# Patient Record
Sex: MALE | Race: WHITE | NOT HISPANIC OR LATINO | Employment: STUDENT | URBAN - METROPOLITAN AREA
[De-identification: names, ages, dates, MRNs, and addresses within clinical notes are randomized per-mention and may not be internally consistent; named-entity substitution may affect disease eponyms.]

---

## 2017-08-11 ENCOUNTER — OFFICE VISIT (OUTPATIENT)
Dept: PEDIATRICS | Facility: CLINIC | Age: 22
End: 2017-08-11
Payer: COMMERCIAL

## 2017-08-11 VITALS
HEIGHT: 73 IN | BODY MASS INDEX: 30.38 KG/M2 | SYSTOLIC BLOOD PRESSURE: 124 MMHG | TEMPERATURE: 99 F | WEIGHT: 229.25 LBS | OXYGEN SATURATION: 97 % | HEART RATE: 87 BPM | DIASTOLIC BLOOD PRESSURE: 58 MMHG

## 2017-08-11 DIAGNOSIS — R05.9 COUGH: ICD-10-CM

## 2017-08-11 DIAGNOSIS — J32.9 RHINOSINUSITIS: Primary | ICD-10-CM

## 2017-08-11 PROCEDURE — 99213 OFFICE O/P EST LOW 20 MIN: CPT | Mod: S$GLB,,, | Performed by: PEDIATRICS

## 2017-08-11 PROCEDURE — 3008F BODY MASS INDEX DOCD: CPT | Mod: S$GLB,,, | Performed by: PEDIATRICS

## 2017-08-11 RX ORDER — AZITHROMYCIN 500 MG/1
500 TABLET, FILM COATED ORAL DAILY
Qty: 7 TABLET | Refills: 0 | Status: SHIPPED | OUTPATIENT
Start: 2017-08-11 | End: 2017-08-18

## 2017-08-11 RX ORDER — PREDNISONE 20 MG/1
60 TABLET ORAL DAILY
Qty: 15 TABLET | Refills: 0 | Status: SHIPPED | OUTPATIENT
Start: 2017-08-11 | End: 2017-08-16

## 2017-08-11 NOTE — PATIENT INSTRUCTIONS
Understanding Sinus Problems    You dont often think about your sinuses until theres a problem. One day you realize you cant smell dinner cooking. Or you find you often have headaches or problems breathing through your nose.  Symptoms of sinus problems  Sinus problems can cause uncomfortable symptoms. Your nose may run constantly. You might have trouble sleeping at night. You may even lose your sense of smell. Other symptoms can include:  · Nasal congestion  · Fullness in ears  · Green, yellow, or bloody drainage from the nose  · Trouble tasting food  · Frequent headaches  · Facial pain  · Cough  When sinuses are blocked  If something blocks the passages in the nose or sinuses, mucus cant drain. Mucus-filled sinuses often become infected.  · Colds cause the lining of the nose and sinuses to swell and make extra mucus. A buildup of mucus can lead to a more serious infection.  · Allergies irritate turbinates and other tissues. This causes swelling, which can cause a blockage. Over time, this irritation can also lead to sacs of swollen tissue (polyps).  · Polyps may form in both the sinuses and nose. Polyps can grow large enough to clog nasal passages and block drainage.  · A crooked (deviated) septum may block nasal passages. This is often the result of an injury.  Date Last Reviewed: 11/1/2016  © 5959-4308 [employer]. [address] [place] [address], [place] [address]. All rights reserved. This information is not intended as a substitute for professional medical care. Always follow your healthcare professional's instructions.        Walking Pneumonia (Mycoplasma Pneumonia)  What is walking pneumonia?    Pneumonia is an infection of one or both of the lungs. It's usually caused by a virus, or bacteria. Walking pneumonia is caused by a specific bacteria. Pneumonia can be very serious, especially in infants, young children, and older adults. And also in those with other long-term health problems or weak  immune systems. In otherwise healthy adults, pneumonia can be mild. Mycoplasma pneumonia is a mild form and is often called walking pneumonia.  What are the symptoms of walking pneumonia?  The most common symptom is a dry, hacking cough. You may also feel tired.  Other symptoms may include:  · Fever  · Headaches  · Chills  · Sweating  · Chest pain  · Ear pain  · Sore throat  How is walking pneumonia diagnosed?  Your health care provider will ask about your medical history and current symptoms. He or she will also examine you. You may also have diagnostic tests including:  · Imaging. A chest X-ray or CT scan of your chest may be done.  · Lab tests. Blood tests and sputum cultures may be done.  How is walking pneumonia treated?  Since it's caused by bacteria, antibiotic medication is usually prescribed. However, it may also go away without any treatment.  Your health care provider may also recommend medications, either prescription or over-the-counter, or other treatment to relieve symptoms. He or she may recommend:  · Acetaminophen or ibuprofen to lower your fever and lessen headache or other pain  · Cough medication to loosen mucus or reduce coughing  · Bedrest or reduced activity  · Increased fluids to loosen mucus and replace lost fluids from sweating  Make sure you check with your health care provider or pharmacist before taking any over-the-counter medications.  What are the complications of walking pneumonia?  Although walking pneumonia is usually mild, and it often goes away without treatment, complications can occur. They include:  · Severe pneumonia  · Serious infections in other parts of the body  · Anemia or a low red blood cell count  · Kidney problems  · Skin conditions  What can I do to prevent walking pneumonia?  To prevent others from getting walking pneumonia:  · Try to stay away from other people if you are coughing a lot.  · Cover your mouth when you cough. It's easy to pass along this infection to  other people through coughing, and contact with surfaces that you cough near.  · Wipe off surfaces, including phones, remote controls, and door knobs with antibacterial or disinfectant products.  · Tell people to wash their hands if they touch things you have coughed near.  · Make sure to wash your hands often. Wash them before handling food or objects that others may touch. Use a separate towel or paper towels for drying.  Date Last Reviewed: 8/5/2014  © 9895-3123 [employer]. [address] [place] [address], [place] [address]. All rights reserved. This information is not intended as a substitute for professional medical care. Always follow your healthcare professional's instructions.

## 2017-08-11 NOTE — PROGRESS NOTES
Subjective:       History provided by patient and patient was brought in for Cough (x 1 day, by self); Nasal Congestion; and Fever    .    History of Present Illness:  HPI Comments: This is a patient well known to my practice who  has no past medical history on file. . The patient presents with cough with chills and a fever.         Review of Systems   Constitutional: Positive for fever.   HENT: Positive for congestion.    Eyes: Negative.    Respiratory: Positive for cough.    Cardiovascular: Negative.    Gastrointestinal: Negative.    Genitourinary: Negative.    Musculoskeletal: Negative.    Neurological: Negative.    Psychiatric/Behavioral: Negative.        Objective:     Physical Exam   HENT:   Right Ear: Hearing normal. Tympanic membrane is erythematous. A middle ear effusion is present.   Left Ear: Hearing normal. Tympanic membrane is erythematous. A middle ear effusion is present.   Nose: Rhinorrhea present. No mucosal edema.   Mouth/Throat: Oropharynx is clear and moist and mucous membranes are normal. No oral lesions.   Cardiovascular: Normal heart sounds.    No murmur heard.  Pulmonary/Chest: Effort normal and breath sounds normal.   Skin: Skin is warm. No rash noted.   Psychiatric: Mood and affect normal.         Assessment:     1. Rhinosinusitis    2. Cough        Plan:     Rhinosinusitis  -     azithromycin (ZITHROMAX) 500 MG tablet; Take 1 tablet (500 mg total) by mouth once daily.  Dispense: 7 tablet; Refill: 0  -     predniSONE (DELTASONE) 20 MG tablet; Take 3 tablets (60 mg total) by mouth once daily.  Dispense: 15 tablet; Refill: 0    Cough  -     azithromycin (ZITHROMAX) 500 MG tablet; Take 1 tablet (500 mg total) by mouth once daily.  Dispense: 7 tablet; Refill: 0  -     predniSONE (DELTASONE) 20 MG tablet; Take 3 tablets (60 mg total) by mouth once daily.  Dispense: 15 tablet; Refill: 0

## 2018-10-15 ENCOUNTER — OFFICE VISIT (OUTPATIENT)
Dept: FAMILY MEDICINE | Facility: CLINIC | Age: 23
End: 2018-10-15
Payer: COMMERCIAL

## 2018-10-15 VITALS
HEART RATE: 68 BPM | BODY MASS INDEX: 24.08 KG/M2 | TEMPERATURE: 98 F | WEIGHT: 181.69 LBS | OXYGEN SATURATION: 98 % | SYSTOLIC BLOOD PRESSURE: 114 MMHG | DIASTOLIC BLOOD PRESSURE: 70 MMHG | HEIGHT: 73 IN

## 2018-10-15 DIAGNOSIS — R30.0 DYSURIA: ICD-10-CM

## 2018-10-15 DIAGNOSIS — Z23 NEED FOR DIPHTHERIA, TETANUS, PERTUSSIS, AND HIB VACCINATION: ICD-10-CM

## 2018-10-15 DIAGNOSIS — R41.840 POOR CONCENTRATION: Primary | ICD-10-CM

## 2018-10-15 PROCEDURE — 99203 OFFICE O/P NEW LOW 30 MIN: CPT | Mod: 25,S$GLB,, | Performed by: FAMILY MEDICINE

## 2018-10-15 PROCEDURE — 99999 PR PBB SHADOW E&M-EST. PATIENT-LVL IV: CPT | Mod: PBBFAC,,, | Performed by: FAMILY MEDICINE

## 2018-10-15 PROCEDURE — 87086 URINE CULTURE/COLONY COUNT: CPT

## 2018-10-15 PROCEDURE — 90471 IMMUNIZATION ADMIN: CPT | Mod: S$GLB,,, | Performed by: FAMILY MEDICINE

## 2018-10-15 PROCEDURE — 3008F BODY MASS INDEX DOCD: CPT | Mod: CPTII,S$GLB,, | Performed by: FAMILY MEDICINE

## 2018-10-15 PROCEDURE — 90715 TDAP VACCINE 7 YRS/> IM: CPT | Mod: S$GLB,,, | Performed by: FAMILY MEDICINE

## 2018-10-15 NOTE — PROGRESS NOTES
Chief Complaint   Patient presents with    ADHD    referral    Urinary Tract Infection       HPI  William Herrera is a 23 y.o. male with multiple medical diagnoses as listed in the medical history and problem list that presents for     Poor concentration- he would like a referral to psych for a formal evaluation as he has had recent difficulties with his college classes and has not been able to stay focused during lectures. He reports having trouble with this during school in his childhood and being reported for daydreaming but being able to manage with his grades. He is studying hospitality    UTI- has been having urinary frequency and some discomfort, treated with azo tablets, has had symptoms resolve but would like to make sure it has gone    PAST MEDICAL HISTORY:  History reviewed. No pertinent past medical history.    PAST SURGICAL HISTORY:  History reviewed. No pertinent surgical history.    SOCIAL HISTORY:  Social History     Socioeconomic History    Marital status: Single     Spouse name: Not on file    Number of children: Not on file    Years of education: Not on file    Highest education level: Not on file   Social Needs    Financial resource strain: Not on file    Food insecurity - worry: Not on file    Food insecurity - inability: Not on file    Transportation needs - medical: Not on file    Transportation needs - non-medical: Not on file   Occupational History     Comment: hotel    Tobacco Use    Smoking status: Light Tobacco Smoker    Smokeless tobacco: Never Used   Substance and Sexual Activity    Alcohol use: Yes     Drinks per session: 1 or 2     Binge frequency: Weekly    Drug use: No    Sexual activity: No   Other Topics Concern    Not on file   Social History Narrative    Not on file       FAMILY HISTORY:  Family History   Problem Relation Age of Onset    Thyroid disease Sister     Diabetes Maternal Aunt     Diabetes Maternal Grandmother     Heart disease Maternal  "Grandmother     Heart disease Maternal Grandfather        ALLERGIES AND MEDICATIONS: updated and reviewed.  Review of patient's allergies indicates:  No Known Allergies  No current outpatient medications on file.     No current facility-administered medications for this visit.        ROS  Review of Systems   Constitutional: Negative for chills, fatigue, fever and unexpected weight change.   HENT: Negative for ear pain, postnasal drip, rhinorrhea, sinus pressure and sore throat.    Eyes: Negative for photophobia and visual disturbance.   Respiratory: Negative for apnea, cough, chest tightness, shortness of breath and wheezing.    Cardiovascular: Negative for chest pain and palpitations.   Gastrointestinal: Negative for abdominal pain, blood in stool, constipation, diarrhea, nausea and vomiting.   Genitourinary: Positive for difficulty urinating and dysuria.   Musculoskeletal: Negative for arthralgias and joint swelling.   Skin: Negative for rash.   Neurological: Negative for facial asymmetry, speech difficulty, weakness, numbness and headaches.   Psychiatric/Behavioral: Negative for dysphoric mood.       Physical Exam  Vitals:    10/15/18 0833   BP: 114/70   BP Location: Left arm   Patient Position: Sitting   BP Method: Small (Manual)   Pulse: 68   Temp: 98.2 °F (36.8 °C)   TempSrc: Oral   SpO2: 98%   Weight: 82.4 kg (181 lb 11.2 oz)   Height: 6' 0.6" (1.844 m)    Body mass index is 24.24 kg/m².  Weight: 82.4 kg (181 lb 11.2 oz)   Height: 6' 0.6" (184.4 cm)     Physical Exam   Constitutional: He is oriented to person, place, and time. He appears well-developed and well-nourished.   HENT:   Head: Normocephalic and atraumatic.   Mouth/Throat: No oropharyngeal exudate.   Eyes: EOM are normal.   Cardiovascular: Normal rate, regular rhythm and normal heart sounds. Exam reveals no gallop and no friction rub.   No murmur heard.  Pulmonary/Chest: Effort normal and breath sounds normal. No respiratory distress. He has no " wheezes. He has no rales. He exhibits no tenderness.   Abdominal: Soft. Bowel sounds are normal. He exhibits no distension and no mass. There is no tenderness. There is no rebound and no guarding. No hernia.   Lymphadenopathy:     He has no cervical adenopathy.   Neurological: He is alert and oriented to person, place, and time.   Skin: Skin is warm and dry.   Psychiatric: He has a normal mood and affect. His behavior is normal.   Nursing note and vitals reviewed.      Health Maintenance       Date Due Completion Date    TETANUS VACCINE 08/01/2018 8/1/2008    Influenza Vaccine 08/01/2018 9/20/2011            ASSESSMENT     1. Poor concentration    2. Dysuria    3. Need for diphtheria, tetanus, pertussis, and Hib vaccination        PLAN:     Problem List Items Addressed This Visit     None      Visit Diagnoses     Poor concentration    -  Primary  -referral given for psych eval for ADD    Relevant Orders    Ambulatory consult to Psychiatry    Dysuria      -sounds like this is resolving, but will culture    Relevant Orders    Urine culture    Need for diphtheria, tetanus, pertussis, and Hib vaccination      -as ordered       Relevant Orders    Tdap Vaccine Greater than or Eqaul to 7 y.o.            Ivet Wyatt MD  10/15/2018 8:47 AM        Follow-up if symptoms worsen or fail to improve.

## 2018-10-15 NOTE — PROGRESS NOTES
Patient tolerated Tdap with no complication. Patient received VIS information. Advise 15 min for any possible reactions.

## 2018-10-16 ENCOUNTER — TELEPHONE (OUTPATIENT)
Dept: FAMILY MEDICINE | Facility: CLINIC | Age: 23
End: 2018-10-16

## 2018-10-16 LAB — BACTERIA UR CULT: NO GROWTH

## 2019-01-03 ENCOUNTER — OFFICE VISIT (OUTPATIENT)
Dept: PSYCHIATRY | Facility: CLINIC | Age: 24
End: 2019-01-03
Payer: COMMERCIAL

## 2019-01-03 VITALS
BODY MASS INDEX: 23.6 KG/M2 | SYSTOLIC BLOOD PRESSURE: 116 MMHG | HEART RATE: 59 BPM | DIASTOLIC BLOOD PRESSURE: 62 MMHG | WEIGHT: 174.25 LBS | HEIGHT: 72 IN

## 2019-01-03 DIAGNOSIS — R41.840 DIFFICULTY CONCENTRATING: Primary | ICD-10-CM

## 2019-01-03 DIAGNOSIS — F32.0 CURRENT MILD EPISODE OF MAJOR DEPRESSIVE DISORDER, UNSPECIFIED WHETHER RECURRENT: ICD-10-CM

## 2019-01-03 DIAGNOSIS — F12.10 CANNABIS ABUSE: ICD-10-CM

## 2019-01-03 LAB
AMP D-AMPHETAMINE 1000 NG/ML: NEGATIVE
BAR SECOBARBITAL 300 NG/ML: NEGATIVE
BUP BUPRENORPHINE 10 NG/ML: NEGATIVE
BZO OXAZEPAM 300 NG/ML: NEGATIVE
COC BENZOYLECGONINE 300 NG/ML: NEGATIVE
MAMP D-METHAMPHETAMINE 1000 NG/ML: NEGATIVE
MOP MORPHINE 300 NG/ML: NEGATIVE
MTD METHADONE 300 NG/ML: NEGATIVE
QXY OXYCODONE 100 NG/ML: NEGATIVE
THC 11-NOR-9-TETRAHYDROCANNABINOL-9-CARBOXYLIC ACID: POSITIVE

## 2019-01-03 PROCEDURE — 3008F PR BODY MASS INDEX (BMI) DOCUMENTED: ICD-10-PCS | Mod: CPTII,S$GLB,, | Performed by: PSYCHIATRY & NEUROLOGY

## 2019-01-03 PROCEDURE — 3008F BODY MASS INDEX DOCD: CPT | Mod: CPTII,S$GLB,, | Performed by: PSYCHIATRY & NEUROLOGY

## 2019-01-03 PROCEDURE — 80305 DRUG TEST PRSMV DIR OPT OBS: CPT | Mod: QW,S$GLB,, | Performed by: PSYCHIATRY & NEUROLOGY

## 2019-01-03 PROCEDURE — 80305 POCT URINE DRUG SCREEN PAIN MANAGEMENT: ICD-10-PCS | Mod: QW,S$GLB,, | Performed by: PSYCHIATRY & NEUROLOGY

## 2019-01-03 PROCEDURE — 99204 PR OFFICE/OUTPT VISIT, NEW, LEVL IV, 45-59 MIN: ICD-10-PCS | Mod: S$GLB,,, | Performed by: PSYCHIATRY & NEUROLOGY

## 2019-01-03 PROCEDURE — 99204 OFFICE O/P NEW MOD 45 MIN: CPT | Mod: S$GLB,,, | Performed by: PSYCHIATRY & NEUROLOGY

## 2019-01-03 RX ORDER — BUPROPION HYDROCHLORIDE 150 MG/1
150 TABLET ORAL DAILY
Qty: 30 TABLET | Refills: 1 | Status: SHIPPED | OUTPATIENT
Start: 2019-01-03 | End: 2019-01-31

## 2019-01-03 NOTE — PROGRESS NOTES
"Outpatient Psychiatry Initial Visit (MD/NP)    1/3/2019    William Herrera, a 23 y.o. male, presenting for initial evaluation visit. Met with patient.    Reason for Encounter: Referral from Dr. Wyatt. Patient complains of Inattentiveness.    History of Present Illness:   Patient reports that he has no previous mental health history. He reports that he presents for an ADHD evaluation. He reports that he read an article online about someone's experience with ADHD inattentive type. He felt like a lot of the experiences that this person had applied to him. He reports that as a child he was day dreamer and he would never be completely focus on the material. He reports that this continued into high school and college. He felt like he always loses track of his sentences and loses his train of thought very easily. He did well in high school but reports that he took easier classes because he wanted to avoid classes that would require more focus and time. He reports that he would often times not want to do the things that his Mother asked him to do.    He has noticed that his symptoms have started to affect him in school and he is currently on academic probation at [employer] for having a GPA below 2.0. He recently took the fall semester off to reevaluate what he wants to do. During his time off he has looked at what life would be like if he were to not complete school. He feels like the job field is not going to be very fruitful if he does not have a bachelors degree. He feels like he has invested a lot of time in this and he wants to complete it.      He has also had problems with remembering things that happen at work and he states that he has to take multiple notes while he is at work to add them to a report at the end of the day. He states that when he was younger his memory was better. At one point he states, "I thought I had dementia."     He reports that in the past month he has had a slightly depressed mood. He has not had any " issues with eating and sleeping. His energy level has been fine. He endorses some guilt about leaving his mother home alone but denies any issues of hopelessness or worthlessness.    He denies any issues with constant worry or panic attacks. He denies any episodes of neelam or psychosis.    At this time he is looking for help with staying focused with school and work. He denies any SI/HI/AVH.     Collateral: Lisbeth Herrera Mother [phone]  She reports that her son had some issues with being passive and being bullied as a child however he did not have any serious behavior issues. She reports that he tried multiple different activities however he could not stay on track with anything. She states that he did not do well in school due to not putting effort into things. He is very intelligent but he does not put the time in. He had chores in the house such as taking out the trash and keeping his room clean however he would not do these tasks without being threatened to be punished.    She also mentions that the patient has a strained relationship with his father due to his sexuality and his father not approving his sexuality.    Review Of Systems:     Pertinent items are noted in HPI.    Current Evaluation:     Nutritional Screening: Considering the patient's height and weight, medications, medical history and preferences, should a referral be made to the dietitian? no    Constitutional  Vitals:  Most recent vital signs, dated less than 90 days prior to this appointment, were reviewed.    There were no vitals filed for this visit.     General:  unremarkable, age appropriate     Musculoskeletal  Muscle Strength/Tone:  no spasicity, no rigidity   Gait & Station:  non-ataxic     Psychiatric  Speech:  no latency; no press   Mood & Affect:  euthymic  congruent and appropriate   Thought Process:  normal and logical   Associations:  intact   Thought Content:  normal, no suicidality, no homicidality, delusions, or paranoia    Insight:  intact   Judgement: behavior is adequate to circumstances   Orientation:  grossly intact   Memory: intact for content of interview   Language: grossly intact   Attention Span & Concentration:  able to focus   Fund of Knowledge:  intact and appropriate to age and level of education       Relevant Elements of Neurological Exam: normal gait    Functioning in Relationships:  Spouse/partner: Patient is not in a relationship  Peers: Limited social network  Employers: Good relationship    Laboratory Data  Appointment on 01/03/2019   Component Date Value Ref Range Status    MAMP d-Methamphetamine 1000 ng/mL * 01/03/2019 Negative   Final    THC 95-byf-7-Tetrahydrocannabinol-* 01/03/2019 Positive   Final    LISSETH Benzoylecgonine 300 ng/mL POC 01/03/2019 Negative   Final    BZO Oxazepam 300 ng/mL POC 01/03/2019 Negative   Final    BUP Buprenorphine 10 ng/mL POC 01/03/2019 Negative   Final    BAR Secobarbital 300 ng/mL POC 01/03/2019 Negative   Final    AMP d-Amphetamine 1000 ng/mL POC 01/03/2019 Negative   Final    OXY Oxycodone 100 ng/mL POC 01/03/2019 Negative   Final    MOP Morphine 300 ng/mL POC 01/03/2019 Negative   Final    MTD Methadone 300 ng/mL POC 01/03/2019 Negative   Final         Medications  No outpatient encounter medications on file as of 1/3/2019.     No facility-administered encounter medications on file as of 1/3/2019.            Assessment - Diagnosis - Goals:     Impression:       ICD-10-CM ICD-9-CM   1. Difficulty concentrating R41.840 799.51   2. Current mild episode of major depressive disorder, unspecified whether recurrent F32.0 296.21   3. Cannabis abuse F12.10 305.20       Strengths and Liabilities: Strength: Patient accepts guidance/feedback, Strength: Patient is expressive/articulate., Strength: Patient is intelligent., Strength: Patient is motivated for change., Strength: Patient is physically healthy., Strength: Patient has positive support network.    Treatment Goals:   Specify outcomes written in observable, behavioral terms:   Depression: increasing energy, increasing motivation and reducing fatigue    Treatment Plan/Recommendations:   · Medication Management: The risks and benefits of medication were discussed with the patient.   · Recommended discontinuation of recreational cannabis use  · Initiate Wellbutrin  mg PO daily  · Will consider stimulant in the future if symptoms persist with a negative Urine Drug Screen      Return to Clinic: 4 weeks    Counseling time: 75 minutes  Total time: 85 minutes  Consulting clinician was informed of the encounter and consult note.

## 2019-01-10 NOTE — PROGRESS NOTES
This encounter was reviewed by me and case was discussed with the resident physician. I agree with the assessment and  treatment plan as stated.    Jerilyn Stephens MD

## 2019-01-31 ENCOUNTER — OFFICE VISIT (OUTPATIENT)
Dept: PSYCHIATRY | Facility: CLINIC | Age: 24
End: 2019-01-31
Payer: COMMERCIAL

## 2019-01-31 VITALS
WEIGHT: 168.75 LBS | DIASTOLIC BLOOD PRESSURE: 68 MMHG | SYSTOLIC BLOOD PRESSURE: 118 MMHG | HEART RATE: 66 BPM | BODY MASS INDEX: 22.86 KG/M2 | HEIGHT: 72 IN

## 2019-01-31 DIAGNOSIS — R41.840 POOR CONCENTRATION: Primary | ICD-10-CM

## 2019-01-31 DIAGNOSIS — F32.0 CURRENT MILD EPISODE OF MAJOR DEPRESSIVE DISORDER, UNSPECIFIED WHETHER RECURRENT: ICD-10-CM

## 2019-01-31 DIAGNOSIS — F12.90 CANNABIS USE, UNSPECIFIED, UNCOMPLICATED: ICD-10-CM

## 2019-01-31 PROCEDURE — 99999 PR PBB SHADOW E&M-EST. PATIENT-LVL II: ICD-10-PCS | Mod: PBBFAC,,, | Performed by: PSYCHIATRY & NEUROLOGY

## 2019-01-31 PROCEDURE — 99214 OFFICE O/P EST MOD 30 MIN: CPT | Mod: S$GLB,,, | Performed by: PSYCHIATRY & NEUROLOGY

## 2019-01-31 PROCEDURE — 3008F PR BODY MASS INDEX (BMI) DOCUMENTED: ICD-10-PCS | Mod: CPTII,S$GLB,, | Performed by: PSYCHIATRY & NEUROLOGY

## 2019-01-31 PROCEDURE — 99999 PR PBB SHADOW E&M-EST. PATIENT-LVL II: CPT | Mod: PBBFAC,,, | Performed by: PSYCHIATRY & NEUROLOGY

## 2019-01-31 PROCEDURE — 3008F BODY MASS INDEX DOCD: CPT | Mod: CPTII,S$GLB,, | Performed by: PSYCHIATRY & NEUROLOGY

## 2019-01-31 PROCEDURE — 99214 PR OFFICE/OUTPT VISIT, EST, LEVL IV, 30-39 MIN: ICD-10-PCS | Mod: S$GLB,,, | Performed by: PSYCHIATRY & NEUROLOGY

## 2019-01-31 RX ORDER — BUPROPION HYDROCHLORIDE 150 MG/1
150 TABLET ORAL DAILY
Qty: 30 TABLET | Refills: 1 | Status: SHIPPED | OUTPATIENT
Start: 2019-01-31 | End: 2023-08-25 | Stop reason: SDUPTHER

## 2019-01-31 RX ORDER — ESCITALOPRAM OXALATE 10 MG/1
10 TABLET ORAL DAILY
Qty: 30 TABLET | Refills: 11 | Status: SHIPPED | OUTPATIENT
Start: 2019-01-31 | End: 2020-01-31

## 2019-01-31 NOTE — PROGRESS NOTES
Outpatient Psychiatry Follow-Up Visit (MD/NP)    1/31/2019    Clinical Status of Patient:  Outpatient (Ambulatory)    Chief Complaint:  William Herrera is a 23 y.o. male who presents today for follow-up of depression.  Met with patient.      Interval History and Content of Current Session:  Interim Events/Subjective Report/Content of Current Session: Patient seen in clinic this morning. He reports that things have been going well. He states that he recently moved out of his mother's home to another place. He has also started school. He states that the Wellbutrin has helped with his mood and he does not have as many low points in his mood. He continues to have trouble with concentration. He has had trouble sitting still in class. He has tried to turn his phone off to stay focused but he will drift off into thinking about other things while he is in class. He feels like he has multiple trains of thought occurring in his head at the same time. He states that he is still smoking marijuana but he has decreased the amount that he is using. He last smoked marijuana a week ago.  Patient is resistant to discontinuing his marijuana use because he needs help going to sleep. Patient denies any SI/HI/AVH. His relationship with his father has improved. He states that he has been eating and sleeping well however he has been using marijuana to assist with this.     Psychotherapy:  · Target symptoms: depression  · Why chosen therapy is appropriate versus another modality: relevant to diagnosis  · Outcome monitoring methods: self-report, lab data  · Therapeutic intervention type: supportive psychotherapy  · Topics discussed/themes: work stress, symptom recognition  · The patient's response to the intervention is guarded. The patient's progress toward treatment goals is good.   · Duration of intervention: 17 minutes.    Review of Systems   · PSYCHIATRIC: Pertinant items are noted in the narrative.    Past Medical, Family and Social  History: The patient's past medical, family and social history have been reviewed and updated as appropriate within the electronic medical record - see encounter notes.    Compliance: yes    Side effects: None  Risk Parameters:  Patient reports no suicidal ideation  Patient reports no homicidal ideation  Patient reports no self-injurious behavior  Patient reports no violent behavior    Exam (detailed: at least 9 elements; comprehensive: all 15 elements)   Constitutional  Vitals:  Most recent vital signs, dated less than 90 days prior to this appointment, were reviewed.   Vitals:    01/31/19 1110   BP: 118/68   Pulse: 66   Weight: 76.5 kg (168 lb 12.2 oz)   Height: 6' (1.829 m)        General:  unremarkable, age appropriate     Musculoskeletal  Muscle Strength/Tone:  no spasicity, no rigidity   Gait & Station:  non-ataxic     Psychiatric  Speech:  no latency; no press   Mood & Affect:  steady  congruent and appropriate   Thought Process:  normal and logical   Associations:  intact   Thought Content:  normal, no suicidality, no homicidality, delusions, or paranoia   Insight:  intact   Judgement: behavior is adequate to circumstances   Orientation:  grossly intact   Memory: intact for content of interview   Language: grossly intact   Attention Span & Concentration:  able to focus   Fund of Knowledge:  intact and appropriate to age and level of education     Assessment and Diagnosis   Status/Progress: Based on the examination today, the patient's problem(s) is/are improved.  New problems have not been presented today.   Co-morbidities, Diagnostic uncertainty and Lack of compliance are complicating management of the primary condition.      General Impression:       ICD-10-CM ICD-9-CM   1. Poor concentration R41.840 799.51   2. Current mild episode of major depressive disorder, unspecified whether recurrent F32.0 296.21   3. Cannabis use, unspecified, uncomplicated F12.90 305.20       Intervention/Counseling/Treatment  Plan   Labs: reviewed most recent  The treatment plan and follow up plan were reviewed with the patient.  Discussed with patient informed consent, risks vs. benefits, alternative treatments, side effect profile and the inherent unpredictability of individual responses to these treatments. The patient expresses understanding of the above and displays the capacity to agree with this current plan and had no other questions.  Encouraged Patient to keep future appointments.   Take medications as prescribed and abstain from substance abuse.   In the event of an emergency patient was advised to go to the emergency room.  · Medication Management: The risks and benefits of medication were discussed with the patient.   · Recommended discontinuation of recreational cannabis use  · Continue Wellbutrin  mg PO daily  · Initiate Lexapro 10 mg PO daily  · Will consider stimulant in the future if symptoms persist with a negative Urine Drug Screen      Return to Clinic: 1 month

## 2019-02-14 ENCOUNTER — HOSPITAL ENCOUNTER (EMERGENCY)
Facility: OTHER | Age: 24
Discharge: HOME | End: 2019-02-14
Attending: EMERGENCY MEDICINE
Payer: COMMERCIAL

## 2019-02-14 VITALS
SYSTOLIC BLOOD PRESSURE: 113 MMHG | WEIGHT: 168 LBS | HEART RATE: 58 BPM | OXYGEN SATURATION: 99 % | TEMPERATURE: 98 F | RESPIRATION RATE: 19 BRPM | HEIGHT: 72 IN | BODY MASS INDEX: 22.75 KG/M2 | DIASTOLIC BLOOD PRESSURE: 62 MMHG

## 2019-02-14 DIAGNOSIS — R55 SYNCOPE: ICD-10-CM

## 2019-02-14 DIAGNOSIS — S00.81XA FACIAL ABRASION, INITIAL ENCOUNTER: Primary | ICD-10-CM

## 2019-02-14 DIAGNOSIS — J02.9 PHARYNGITIS, UNSPECIFIED ETIOLOGY: ICD-10-CM

## 2019-02-14 LAB — DEPRECATED S PYO AG THROAT QL EIA: NEGATIVE

## 2019-02-14 PROCEDURE — 87081 CULTURE SCREEN ONLY: CPT

## 2019-02-14 PROCEDURE — 93010 ELECTROCARDIOGRAM REPORT: CPT | Mod: ,,, | Performed by: INTERNAL MEDICINE

## 2019-02-14 PROCEDURE — 87880 STREP A ASSAY W/OPTIC: CPT

## 2019-02-14 PROCEDURE — 25000003 PHARM REV CODE 250: Performed by: EMERGENCY MEDICINE

## 2019-02-14 PROCEDURE — 63600175 PHARM REV CODE 636 W HCPCS: Performed by: EMERGENCY MEDICINE

## 2019-02-14 PROCEDURE — 93010 EKG 12-LEAD: ICD-10-PCS | Mod: ,,, | Performed by: INTERNAL MEDICINE

## 2019-02-14 PROCEDURE — 99283 EMERGENCY DEPT VISIT LOW MDM: CPT

## 2019-02-14 PROCEDURE — 93005 ELECTROCARDIOGRAM TRACING: CPT

## 2019-02-14 RX ORDER — MUPIROCIN 20 MG/G
1 OINTMENT TOPICAL
Status: COMPLETED | OUTPATIENT
Start: 2019-02-14 | End: 2019-02-14

## 2019-02-14 RX ORDER — NAPROXEN 500 MG/1
500 TABLET ORAL
Status: COMPLETED | OUTPATIENT
Start: 2019-02-14 | End: 2019-02-14

## 2019-02-14 RX ORDER — NAPROXEN 500 MG/1
500 TABLET ORAL 2 TIMES DAILY PRN
Qty: 60 TABLET | Refills: 0 | Status: SHIPPED | OUTPATIENT
Start: 2019-02-14

## 2019-02-14 RX ORDER — DEXAMETHASONE 4 MG/1
8 TABLET ORAL
Status: COMPLETED | OUTPATIENT
Start: 2019-02-14 | End: 2019-02-14

## 2019-02-14 RX ADMIN — MUPIROCIN 22 G: 20 OINTMENT TOPICAL at 04:02

## 2019-02-14 RX ADMIN — DEXAMETHASONE 8 MG: 4 TABLET ORAL at 04:02

## 2019-02-14 RX ADMIN — NAPROXEN 500 MG: 500 TABLET ORAL at 04:02

## 2019-02-14 NOTE — DISCHARGE INSTRUCTIONS
Call your primary care doctor to make the first available appointment.     Keep all your medical appointments.     Take your regular medication as prescribed. Contact your primary care provider before running out of medication, or for any problems obtaining them.    Do not drive or operate heavy machinery while taking opioid or muscle relaxing medications. Examples include norco, percocet, xanax, valium, flexeril.     Overuse or long term use of pain and sedating medication may lead to addiction, dependence, organ failure, family and work problems, legal problems, accidental overdose and death.    If you do not have health insurance, you probably qualify for heavily discounted rates:  Call 1-281.498.2003 ([facility] hotline) or go to www.[place].gov    Your evaluation in the ED does not suggest any emergent or life threatening medical condition requiring admission or immediate intervention beyond that provided in the ED.   However, the signs of a serious problem sometimes take more time to appear.   RETURN TO THE ER if any of the following occur:    Weakness, dizziness, fainting, or loss of consciousness   Fever of 100.4ºF (38ºC) or higher  Any worse symptoms  Any new or concerning symptoms

## 2019-02-14 NOTE — ED PROVIDER NOTES
"Encounter Date: 2/14/2019    SCRIBE #1 NOTE: I, Chhaya Cabrera, am scribing for, and in the presence of, Dr. Juan.       History     Chief Complaint   Patient presents with    Loss of Consciousness     syncopal episode tonight. abrasion to nose.      Time seen by provider: 3:42 AM    This is a 23 y.o. male who presents s/p syncopal episode. He had sore throat and difficulty swallowing prior to LOC, which have improved. He has had syncopal episodes in the past, but denies this episode is similar. He bit his tongue during episode and regained consciousness after "a couple" minutes. He denies other injury. He reports regular fluid and PO intake today. He denies recent change in antidepressant dose or any new medications.  Did not take any medication for his sore throat, explaining that it improved, causing only mild discomfort.        Review of patient's allergies indicates:  No Known Allergies  No past medical history on file.  No past surgical history on file.  Family History   Problem Relation Age of Onset    Thyroid disease Sister     Diabetes Maternal Aunt     Diabetes Maternal Grandmother     Heart disease Maternal Grandmother     Heart disease Maternal Grandfather     Depression Mother     Anxiety disorder Mother      Social History     Tobacco Use    Smoking status: Light Tobacco Smoker    Smokeless tobacco: Never Used   Substance Use Topics    Alcohol use: Yes     Drinks per session: 1 or 2     Binge frequency: Weekly     Comment: 2 bottles of wine monthly    Drug use: No     Review of Systems  ROS: As per HPI and below:   General: No fever.   HENT: Notes sore throat and difficulty swallowing. No facial pain.   Eyes: No eye pain.   Cardiovascular: No chest pain.   Respiratory: No dyspnea.   GI: No abdominal pain.   Skin: No rashes.  Neuro: Notes syncope.  Musculoskeletal: No extremity pain. No swelling.    All other systems negative.     Physical Exam     Initial Vitals [02/14/19 0326]   BP Pulse " Resp Temp SpO2   122/81 67 16 98.4 °F (36.9 °C) 100 %      MAP       --         Physical Exam  Nursing note and vitals reviewed.  /62 Comment: orthostatics standing, denies symptoms   Pulse (!) 58   Temp 97.7 °F (36.5 °C) (Oral)   Resp 19   Ht 6' (1.829 m)   Wt 76.2 kg (168 lb)   SpO2 99%   BMI 22.78 kg/m²   Constitutional: AAOx3. Well-developed and well-nourished. No distress.  HENT:   Mouth/Throat: Oropharynx is clear and moist. Tongue abrasions anteriorly. No lacerations to repair. Uvula erythema.  Uvula midline. No tonsillar edema.  No tonsillar exudates.   Eyes: EOMI. No discharge. Anicteric.  Neck: Normal range of motion. Neck supple.  No Anterior cervical lymphadenopathy.  No muffled voice. No midline cervical tenderness.  Cardiovascular: Normal rate. No murmur, no gallop and no friction rub heard.   Pulmonary/Chest: No respiratory distress. Effort normal. No wheezes, no rales, no rhonchi  Abdominal: Bowel sounds normal. Soft. No distension and no mass. There is no tenderness. There is no rebound, no guarding, no tenderness at McBurney's point and negative Diaz's sign.   Musculoskeletal: Normal range of motion.   Neurological: GCS 15. Alert and oriented to person, place, and time. No gross cranial nerve, light touch or strength deficit. Coordination normal. Normal gait.  Skin: Skin is warm and dry.   Psychiatric: Behavior is normal. Judgment normal.      ED Course   Procedures  Labs Reviewed   THROAT SCREEN, RAPID   CULTURE, STREP A,  THROAT        Pre hospital EKG: Sinus bradycardia, rate of 50, no STEMI, no ischemic changes, no Brugada, no QT prolongation, no evidence of HOCM, no comparisons immediately available.  Hospital EKG: Sinus bradacrdia, rate of 56, no STEMI, no ischemic changes, no Brugada, no QT prolongation, no evidence of HOCM, no acute change comapred to pre hopsital EKG.    Imaging Results    None          Medical Decision Making:   Clinical Tests:   Lab Tests: Ordered and  Reviewed  Medical Tests: Ordered and Reviewed    I discussed with patient and/or guardian/caretaker that this evaluation in the ED does not suggest any emergent or life threatening medical condition requiring admission or immediate intervention beyond what was provided in the ED. Regardless, an unremarkable evaluation in the ED does not preclude the development or presence of a serious or life threatening condition. As such, patient was instructed to return immediately for any worsening or change in current symptoms.     I had a detailed discussion with patient  and/or guardian/caretaker regarding findings, plan, return precautions, importance of medication adherence, need to follow-up with a PCP and specialist. All questions answered.     Note was created using voice recognition software. Note may have occasional typographical errors that may not have been identified and edited despite initial review prior to signing.            Scribe Attestation:   Scribe #1: I performed the above scribed service and the documentation accurately describes the services I performed. I attest to the accuracy of the note.    Attending Attestation:           Physician Attestation for Scribe:  Physician Attestation Statement for Scribe #1: I, Dr. Juan, reviewed documentation, as scribed by Chhaya Cabrera in my presence, and it is both accurate and complete.                 ED Course as of Feb 14 0458   Thu Feb 14, 2019   0438 Patient is a 23-year-old male with no reported past medical history who presents for evaluation after syncope just prior to arrival.  Patient states he was in his usual state of health apart from some mild throat discomfort, had a brief prodrome of feeling weak, then had a witnessed syncopal episode.  He states that her worker's prescribed a short.  Before the patient regained normal consciousness.  No report of abnormal movements.  He denies any family history of sudden cardiac death, and denies any recent  palpitations.  Initial differential included dysrhythmia, dehydration, orthostasis, strep pharyngitis.  Patient had no orthostasis.  By Nexus criteria patient did not require imaging of the neck; risk of imaging exceeds benefit.  He had no evidence of head trauma.  CT head not indicated at this time.  No severe etiology suggested by EKG.  Patient has no high risk features by [place] syncope Rule.      [RC]      ED Course User Index  [RC] Ollie Juan MD     Clinical Impression:     1. Facial abrasion, initial encounter    2. Syncope    3. Pharyngitis, unspecified etiology                                 Ollie Juan MD  02/14/19 0451

## 2019-02-14 NOTE — ED TRIAGE NOTES
Pt presents with syncopal episode onset about 20 min PTA. Pt was speaking with a coworker and then reports waking up on the floor. Pt declines EMS ride, reports EMS told him he was hypotensive and bradycardic and should proceed to ED. + lac with ecchymosis to nose, no active bleeding at this time. abrasion to tongue. pts glasses were broken during fall. Pt reports feeling like he had some swelling to his throat throughout the day, denies any other symptoms prior to passing out. Pt reports hx of syncope with being sick, states cant remember the last time he had syncopal episode.  Pt is well appearing, pt in NAD.

## 2019-02-16 LAB — BACTERIA THROAT CULT: NORMAL

## 2019-02-22 ENCOUNTER — OFFICE VISIT (OUTPATIENT)
Dept: PSYCHIATRY | Facility: CLINIC | Age: 24
End: 2019-02-22
Payer: COMMERCIAL

## 2019-02-22 VITALS
SYSTOLIC BLOOD PRESSURE: 136 MMHG | HEART RATE: 59 BPM | BODY MASS INDEX: 22.84 KG/M2 | WEIGHT: 168.63 LBS | DIASTOLIC BLOOD PRESSURE: 65 MMHG | HEIGHT: 72 IN

## 2019-02-22 DIAGNOSIS — F12.90 CANNABIS USE, UNSPECIFIED, UNCOMPLICATED: ICD-10-CM

## 2019-02-22 DIAGNOSIS — F32.0 CURRENT MILD EPISODE OF MAJOR DEPRESSIVE DISORDER, UNSPECIFIED WHETHER RECURRENT: ICD-10-CM

## 2019-02-22 DIAGNOSIS — R41.840 DIFFICULTY CONCENTRATING: Primary | ICD-10-CM

## 2019-02-22 PROCEDURE — 80305 POCT URINE DRUG SCREEN PAIN MANAGEMENT: ICD-10-PCS | Mod: QW,S$GLB,, | Performed by: PSYCHIATRY & NEUROLOGY

## 2019-02-22 PROCEDURE — 99214 OFFICE O/P EST MOD 30 MIN: CPT | Mod: S$GLB,,, | Performed by: PSYCHIATRY & NEUROLOGY

## 2019-02-22 PROCEDURE — 3008F BODY MASS INDEX DOCD: CPT | Mod: CPTII,S$GLB,, | Performed by: PSYCHIATRY & NEUROLOGY

## 2019-02-22 PROCEDURE — 99999 PR PBB SHADOW E&M-EST. PATIENT-LVL II: ICD-10-PCS | Mod: PBBFAC,,, | Performed by: PSYCHIATRY & NEUROLOGY

## 2019-02-22 PROCEDURE — 99999 PR PBB SHADOW E&M-EST. PATIENT-LVL II: CPT | Mod: PBBFAC,,, | Performed by: PSYCHIATRY & NEUROLOGY

## 2019-02-22 PROCEDURE — 99214 PR OFFICE/OUTPT VISIT, EST, LEVL IV, 30-39 MIN: ICD-10-PCS | Mod: S$GLB,,, | Performed by: PSYCHIATRY & NEUROLOGY

## 2019-02-22 PROCEDURE — 3008F PR BODY MASS INDEX (BMI) DOCUMENTED: ICD-10-PCS | Mod: CPTII,S$GLB,, | Performed by: PSYCHIATRY & NEUROLOGY

## 2019-02-22 PROCEDURE — 80305 DRUG TEST PRSMV DIR OPT OBS: CPT | Mod: QW,S$GLB,, | Performed by: PSYCHIATRY & NEUROLOGY

## 2019-02-22 NOTE — PROGRESS NOTES
Outpatient Psychiatry Follow-Up Visit (MD/NP)    2/22/2019    Clinical Status of Patient:  Outpatient (Ambulatory)    Chief Complaint:  William Herrera is a 23 y.o. male who presents today for follow-up of depression, substance problems and attention problems.  Met with patient.      Interval History and Content of Current Session:  Interim Events/Subjective Report/Content of Current Session: Patient seen in clinic this morning. He reports that since our last appointment he has continued to have problems with staying focused and he has been working out more often. He states that since our last appointment he has stopped smoking marijuana. He states that he has not been smoking for roughly 3 weeks. He feels like not smoking the marijuana has helped with his concentration at work. He has continued to have trouble following his lectures in school and he has been having to go through the material multiple times. 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 He states that he has continued to take the Wellbutrin daily he did not take the Lexapro because he read reviews about it online and states that he did not think he needed the medication. He has been eating and sleeping well. He has to take Melatonin some nights but otherwise he has been doing well. He is sleeping roughly 4-5 hours a night. He has been talking to his parents more since his last appointment. He denies any SI/HI/AVH. He denies any physical complaints at this time.     Psychotherapy:  · Target symptoms: depression, substance abuse, work stress  · Why chosen therapy is appropriate versus another modality: relevant to diagnosis  · Outcome monitoring methods: self-report  · Therapeutic intervention type: supportive psychotherapy  · Topics discussed/themes: work stress, symptom recognition  · The patient's response to the intervention is guarded. The patient's progress toward treatment goals is fair.   · Duration of intervention: 13 minutes.    Review  of Systems   · PSYCHIATRIC: Pertinant items are noted in the narrative.    Past Medical, Family and Social History: The patient's past medical, family and social history have been reviewed and updated as appropriate within the electronic medical record - see encounter notes.    Compliance: yes    Side effects: None    Risk Parameters:  Patient reports no suicidal ideation  Patient reports no homicidal ideation  Patient reports no self-injurious behavior  Patient reports no violent behavior    Exam (detailed: at least 9 elements; comprehensive: all 15 elements)   Constitutional  Vitals:  Most recent vital signs, dated less than 90 days prior to this appointment, were reviewed.   Vitals:    02/22/19 0829   BP: 136/65   Pulse: (!) 59   Weight: 76.5 kg (168 lb 10.4 oz)   Height: 6' (1.829 m)        General:  unremarkable, age appropriate     Musculoskeletal  Muscle Strength/Tone:  no spasicity, no rigidity   Gait & Station:  non-ataxic     Psychiatric  Speech:  no latency; no press   Mood & Affect:  steady  congruent and appropriate   Thought Process:  normal and logical   Associations:  intact   Thought Content:  normal, no suicidality, no homicidality, delusions, or paranoia   Insight:  intact   Judgement: behavior is adequate to circumstances   Orientation:  grossly intact   Memory: intact for content of interview   Language: grossly intact   Attention Span & Concentration:  able to focus   Fund of Knowledge:  intact and appropriate to age and level of education     Assessment and Diagnosis   Status/Progress: Based on the examination today, the patient's problem(s) is/are adequately but not ideally controlled.  New problems have not been presented today.   Co-morbidities, Diagnostic uncertainty and Lack of compliance are complicating management of the primary condition.      General Impression:       ICD-10-CM ICD-9-CM   1. Difficulty concentrating R41.840 799.51   2. Cannabis use, unspecified, uncomplicated F12.90  305.20   3. Current mild episode of major depressive disorder, unspecified whether recurrent F32.0 296.21       Intervention/Counseling/Treatment Plan   · Labs: reviewed most recent  · The treatment plan and follow up plan were reviewed with the patient.  · Discussed with patient informed consent, risks vs. benefits, alternative treatments, side effect profile and the inherent unpredictability of individual responses to these treatments. The patient expresses understanding of the above and displays the capacity to agree with this current plan and had no other questions.  · Encouraged Patient to keep future appointments.   · Take medications as prescribed and abstain from substance abuse.   · In the event of an emergency patient was advised to go to the emergency room.  · Medication Management: The risks and benefits of medication were discussed with the patient.   · Recommended discontinuation of recreational cannabis use  · Continue Wellbutrin  mg PO daily  · Will consider stimulant in the future if symptoms persist with a negative Urine Drug Screen        Return to Clinic: 1 month

## 2023-08-25 ENCOUNTER — OFFICE VISIT (OUTPATIENT)
Dept: FAMILY MEDICINE | Facility: CLINIC | Age: 28
End: 2023-08-25
Payer: COMMERCIAL

## 2023-08-25 ENCOUNTER — HOSPITAL ENCOUNTER (OUTPATIENT)
Dept: RADIOLOGY | Facility: HOSPITAL | Age: 28
Discharge: HOME | End: 2023-08-25
Attending: FAMILY MEDICINE
Payer: COMMERCIAL

## 2023-08-25 VITALS
WEIGHT: 225.19 LBS | DIASTOLIC BLOOD PRESSURE: 78 MMHG | TEMPERATURE: 99 F | HEIGHT: 72 IN | HEART RATE: 68 BPM | OXYGEN SATURATION: 98 % | SYSTOLIC BLOOD PRESSURE: 106 MMHG | BODY MASS INDEX: 30.5 KG/M2

## 2023-08-25 DIAGNOSIS — M25.362 INSTABILITY OF KNEE JOINT, LEFT: ICD-10-CM

## 2023-08-25 DIAGNOSIS — F32.A ANXIETY AND DEPRESSION: ICD-10-CM

## 2023-08-25 DIAGNOSIS — Z00.00 ANNUAL PHYSICAL EXAM: ICD-10-CM

## 2023-08-25 DIAGNOSIS — F41.9 ANXIETY AND DEPRESSION: ICD-10-CM

## 2023-08-25 DIAGNOSIS — Z00.00 ENCOUNTER FOR MEDICAL EXAMINATION TO ESTABLISH CARE: Primary | ICD-10-CM

## 2023-08-25 PROCEDURE — 73560 X-RAY EXAM OF KNEE 1 OR 2: CPT | Mod: 26,LT,, | Performed by: RADIOLOGY

## 2023-08-25 PROCEDURE — 1160F PR REVIEW ALL MEDS BY PRESCRIBER/CLIN PHARMACIST DOCUMENTED: ICD-10-PCS | Mod: CPTII,S$GLB,, | Performed by: FAMILY MEDICINE

## 2023-08-25 PROCEDURE — 3008F PR BODY MASS INDEX (BMI) DOCUMENTED: ICD-10-PCS | Mod: CPTII,S$GLB,, | Performed by: FAMILY MEDICINE

## 2023-08-25 PROCEDURE — 99204 PR OFFICE/OUTPT VISIT, NEW, LEVL IV, 45-59 MIN: ICD-10-PCS | Mod: 25,S$GLB,, | Performed by: FAMILY MEDICINE

## 2023-08-25 PROCEDURE — 73560 XR KNEE 1 OR 2 VIEW LEFT: ICD-10-PCS | Mod: 26,LT,, | Performed by: RADIOLOGY

## 2023-08-25 PROCEDURE — 99204 OFFICE O/P NEW MOD 45 MIN: CPT | Mod: 25,S$GLB,, | Performed by: FAMILY MEDICINE

## 2023-08-25 PROCEDURE — 99385 PREV VISIT NEW AGE 18-39: CPT | Mod: S$GLB,,, | Performed by: FAMILY MEDICINE

## 2023-08-25 PROCEDURE — 3078F DIAST BP <80 MM HG: CPT | Mod: CPTII,S$GLB,, | Performed by: FAMILY MEDICINE

## 2023-08-25 PROCEDURE — 99385 PR PREVENTIVE VISIT,NEW,18-39: ICD-10-PCS | Mod: S$GLB,,, | Performed by: FAMILY MEDICINE

## 2023-08-25 PROCEDURE — 73560 X-RAY EXAM OF KNEE 1 OR 2: CPT | Mod: TC,FY,PO,LT

## 2023-08-25 PROCEDURE — 99999 PR PBB SHADOW E&M-NEW PATIENT-LVL III: ICD-10-PCS | Mod: PBBFAC,,, | Performed by: FAMILY MEDICINE

## 2023-08-25 PROCEDURE — 3074F PR MOST RECENT SYSTOLIC BLOOD PRESSURE < 130 MM HG: ICD-10-PCS | Mod: CPTII,S$GLB,, | Performed by: FAMILY MEDICINE

## 2023-08-25 PROCEDURE — 1160F RVW MEDS BY RX/DR IN RCRD: CPT | Mod: CPTII,S$GLB,, | Performed by: FAMILY MEDICINE

## 2023-08-25 PROCEDURE — 3008F BODY MASS INDEX DOCD: CPT | Mod: CPTII,S$GLB,, | Performed by: FAMILY MEDICINE

## 2023-08-25 PROCEDURE — 3074F SYST BP LT 130 MM HG: CPT | Mod: CPTII,S$GLB,, | Performed by: FAMILY MEDICINE

## 2023-08-25 PROCEDURE — 1159F PR MEDICATION LIST DOCUMENTED IN MEDICAL RECORD: ICD-10-PCS | Mod: CPTII,S$GLB,, | Performed by: FAMILY MEDICINE

## 2023-08-25 PROCEDURE — 99999 PR PBB SHADOW E&M-NEW PATIENT-LVL III: CPT | Mod: PBBFAC,,, | Performed by: FAMILY MEDICINE

## 2023-08-25 PROCEDURE — 3078F PR MOST RECENT DIASTOLIC BLOOD PRESSURE < 80 MM HG: ICD-10-PCS | Mod: CPTII,S$GLB,, | Performed by: FAMILY MEDICINE

## 2023-08-25 PROCEDURE — 1159F MED LIST DOCD IN RCRD: CPT | Mod: CPTII,S$GLB,, | Performed by: FAMILY MEDICINE

## 2023-08-25 RX ORDER — METRONIDAZOLE 500 MG/1
500 TABLET ORAL 3 TIMES DAILY
COMMUNITY
Start: 2023-07-29

## 2023-08-25 RX ORDER — BUPROPION HYDROCHLORIDE 300 MG/1
300 TABLET ORAL DAILY
Qty: 30 TABLET | Refills: 11 | Status: SHIPPED | OUTPATIENT
Start: 2023-08-25 | End: 2024-08-24

## 2023-08-25 NOTE — PROGRESS NOTES
"Assessment & Plan:    Encounter for medical examination to establish care    Annual physical exam  -     CBC Auto Differential; Future; Expected date: 08/25/2023  -     Comprehensive Metabolic Panel; Future; Expected date: 08/25/2023  -     Hemoglobin A1C; Future; Expected date: 08/25/2023  -     Lipid Panel; Future; Expected date: 08/25/2023  -     HIV 1/2 Ag/Ab (4th Gen); Future; Expected date: 08/25/2023  -     Hepatitis C Antibody; Future; Expected date: 08/25/2023    Fasting labs to be scheduled.    Instability of knee joint, left  -     X-Ray Knee 1 or 2 View Left; Future; Expected date: 08/25/2023    Will schedule x-ray.   May benefit from PT.    Anxiety and depression  -     buPROPion (WELLBUTRIN XL) 300 MG 24 hr tablet; Take 1 tablet (300 mg total) by mouth once daily.  Dispense: 30 tablet; Refill: 11    Increase Wellbutrin XL to 300 mg. Allow up to 6 weeks to reach full efficacy.         Follow-up: Follow up in about 6 weeks (around 10/6/2023) for anxiety and depression.  ______________________________________________________________________    Chief Complaint  Chief Complaint   Patient presents with    Establish Care     Left knee feels uncomfortable at times       HPI  William Herrera is a 28 y.o. male with medical diagnoses as listed in the medical history and problem list that presents to the office to establish care. He would like to discuss the following concerns:    Left knee feeling "incorrect" - Patient reports a sensation of instability of the left knee after he slipped in a puddle and fell backwards about 2 years ago. He states that he may have overextended the knee. He has a lot of clicking and popping. Denies emre pain. He did not have insurance at the time so he never had it evaluated.     Anxiety and depression - Chronic. He was taking Wellbutrin  mg and has been hesitant to take antidepressants but would like to try a higher dose. Tolerates it well.       PAST MEDICAL HISTORY:  History " reviewed. No pertinent past medical history.    PAST SURGICAL HISTORY:  History reviewed. No pertinent surgical history.    SOCIAL HISTORY:  Social History     Socioeconomic History    Marital status: Single   Occupational History     Comment: hotel    Tobacco Use    Smoking status: Light Smoker    Smokeless tobacco: Never   Substance and Sexual Activity    Alcohol use: Yes     Comment: 2 bottles of wine monthly    Drug use: No    Sexual activity: Never   Social History Narrative    Patient was born and raised in [place]. He was raised by his biological parents. He has one older sister. He denies any early trauma. He is currently enrolled at the [employer] studying [employer]. He is currently living at home with his mother but plans on moving out to a new apartment with a roommate in a week. He currently works at the [employer]. He has been working there since 2017. He is single and does not have any children. He denies any previous legal history. He will drinking 2 bottles of wine monthly roughly.         He denies any illicit drug use.       FAMILY HISTORY:  Family History   Problem Relation Age of Onset    Thyroid disease Sister     Diabetes Maternal Aunt     Diabetes Maternal Grandmother     Heart disease Maternal Grandmother     Heart disease Maternal Grandfather     Depression Mother     Anxiety disorder Mother        ALLERGIES AND MEDICATIONS: updated and reviewed.  Review of patient's allergies indicates:  No Known Allergies  Current Outpatient Medications   Medication Sig Dispense Refill    buPROPion (WELLBUTRIN XL) 300 MG 24 hr tablet Take 1 tablet (300 mg total) by mouth once daily. 30 tablet 11    escitalopram oxalate (LEXAPRO) 10 MG tablet Take 1 tablet (10 mg total) by mouth once daily. (Patient not taking: Reported on 8/25/2023) 30 tablet 11    metroNIDAZOLE (FLAGYL) 500 MG tablet Take 500 mg by mouth 3 (three) times daily.      naproxen (NAPROSYN)  500 MG tablet Take 1 tablet (500 mg total) by mouth 2 (two) times daily as needed (pain). (Patient not taking: Reported on 8/25/2023) 60 tablet 0     No current facility-administered medications for this visit.         ROS  Review of Systems   Constitutional:  Negative for activity change.   Musculoskeletal:  Negative for arthralgias, gait problem and joint swelling.   Psychiatric/Behavioral:  Positive for dysphoric mood. The patient is nervous/anxious.            Physical Exam  Vitals:    08/25/23 1507   BP: 106/78   BP Location: Left arm   Patient Position: Sitting   BP Method: Medium (Manual)   Pulse: 68   Temp: 98.9 °F (37.2 °C)   TempSrc: Oral   SpO2: 98%   Weight: 102.2 kg (225 lb 3.2 oz)   Height: 6' (1.829 m)    Body mass index is 30.54 kg/m².  Weight: 102.2 kg (225 lb 3.2 oz)   Height: 6' (182.9 cm)   Physical Exam  Constitutional:       General: He is not in acute distress.  HENT:      Head: Normocephalic and atraumatic.   Neck:      Thyroid: No thyromegaly.   Cardiovascular:      Rate and Rhythm: Normal rate and regular rhythm.      Pulses: Normal pulses.      Heart sounds: Normal heart sounds.   Pulmonary:      Effort: Pulmonary effort is normal. No respiratory distress.      Breath sounds: Normal breath sounds.   Musculoskeletal:      Cervical back: Neck supple.      Left knee: No swelling, deformity, bony tenderness or crepitus. Normal range of motion. No tenderness.      Instability Tests: Anterior drawer test negative. Posterior drawer test negative. Anterior Lachman test negative. Medial Armida test negative and lateral Armida test negative.      Right lower leg: No edema.      Left lower leg: No edema.   Lymphadenopathy:      Cervical: No cervical adenopathy.   Skin:     General: Skin is warm and dry.      Findings: No rash.   Neurological:      General: No focal deficit present.      Mental Status: He is alert and oriented to person, place, and time.   Psychiatric:         Mood and Affect:  Mood normal.         Behavior: Behavior normal.         Thought Content: Thought content normal.

## 2023-08-28 ENCOUNTER — LAB VISIT (OUTPATIENT)
Dept: LAB | Facility: HOSPITAL | Age: 28
End: 2023-08-28
Attending: FAMILY MEDICINE
Payer: COMMERCIAL

## 2023-08-28 DIAGNOSIS — Z00.00 ANNUAL PHYSICAL EXAM: ICD-10-CM

## 2023-08-28 LAB
ALBUMIN SERPL BCP-MCNC: 4.2 G/DL (ref 3.5–5.2)
ALP SERPL-CCNC: 56 U/L (ref 55–135)
ALT SERPL W/O P-5'-P-CCNC: 42 U/L (ref 10–44)
ANION GAP SERPL CALC-SCNC: 9 MMOL/L (ref 8–16)
AST SERPL-CCNC: 25 U/L (ref 10–40)
BASOPHILS # BLD AUTO: 0.04 K/UL (ref 0–0.2)
BASOPHILS NFR BLD: 0.5 % (ref 0–1.9)
BILIRUB SERPL-MCNC: 1.2 MG/DL (ref 0.1–1)
BUN SERPL-MCNC: 15 MG/DL (ref 6–20)
CALCIUM SERPL-MCNC: 10 MG/DL (ref 8.7–10.5)
CHLORIDE SERPL-SCNC: 105 MMOL/L (ref 95–110)
CHOLEST SERPL-MCNC: 129 MG/DL (ref 120–199)
CHOLEST/HDLC SERPL: 2.9 {RATIO} (ref 2–5)
CO2 SERPL-SCNC: 25 MMOL/L (ref 23–29)
CREAT SERPL-MCNC: 0.8 MG/DL (ref 0.5–1.4)
DIFFERENTIAL METHOD: NORMAL
EOSINOPHIL # BLD AUTO: 0.4 K/UL (ref 0–0.5)
EOSINOPHIL NFR BLD: 5 % (ref 0–8)
ERYTHROCYTE [DISTWIDTH] IN BLOOD BY AUTOMATED COUNT: 12.2 % (ref 11.5–14.5)
EST. GFR  (NO RACE VARIABLE): >60 ML/MIN/1.73 M^2
ESTIMATED AVG GLUCOSE: 94 MG/DL (ref 68–131)
GLUCOSE SERPL-MCNC: 80 MG/DL (ref 70–110)
HBA1C MFR BLD: 4.9 % (ref 4–5.6)
HCT VFR BLD AUTO: 44.5 % (ref 40–54)
HCV AB SERPL QL IA: NORMAL
HDLC SERPL-MCNC: 45 MG/DL (ref 40–75)
HDLC SERPL: 34.9 % (ref 20–50)
HGB BLD-MCNC: 14.8 G/DL (ref 14–18)
HIV 1+2 AB+HIV1 P24 AG SERPL QL IA: NORMAL
IMM GRANULOCYTES # BLD AUTO: 0.03 K/UL (ref 0–0.04)
IMM GRANULOCYTES NFR BLD AUTO: 0.4 % (ref 0–0.5)
LDLC SERPL CALC-MCNC: 67.8 MG/DL (ref 63–159)
LYMPHOCYTES # BLD AUTO: 2 K/UL (ref 1–4.8)
LYMPHOCYTES NFR BLD: 27.2 % (ref 18–48)
MCH RBC QN AUTO: 29.6 PG (ref 27–31)
MCHC RBC AUTO-ENTMCNC: 33.3 G/DL (ref 32–36)
MCV RBC AUTO: 89 FL (ref 82–98)
MONOCYTES # BLD AUTO: 0.7 K/UL (ref 0.3–1)
MONOCYTES NFR BLD: 9 % (ref 4–15)
NEUTROPHILS # BLD AUTO: 4.3 K/UL (ref 1.8–7.7)
NEUTROPHILS NFR BLD: 57.9 % (ref 38–73)
NONHDLC SERPL-MCNC: 84 MG/DL
NRBC BLD-RTO: 0 /100 WBC
PLATELET # BLD AUTO: 261 K/UL (ref 150–450)
PMV BLD AUTO: 10.6 FL (ref 9.2–12.9)
POTASSIUM SERPL-SCNC: 3.8 MMOL/L (ref 3.5–5.1)
PROT SERPL-MCNC: 7.5 G/DL (ref 6–8.4)
RBC # BLD AUTO: 5 M/UL (ref 4.6–6.2)
SODIUM SERPL-SCNC: 139 MMOL/L (ref 136–145)
TRIGL SERPL-MCNC: 81 MG/DL (ref 30–150)
WBC # BLD AUTO: 7.46 K/UL (ref 3.9–12.7)

## 2023-08-28 PROCEDURE — 83036 HEMOGLOBIN GLYCOSYLATED A1C: CPT | Performed by: FAMILY MEDICINE

## 2023-08-28 PROCEDURE — 80053 COMPREHEN METABOLIC PANEL: CPT | Performed by: FAMILY MEDICINE

## 2023-08-28 PROCEDURE — 87389 HIV-1 AG W/HIV-1&-2 AB AG IA: CPT | Performed by: FAMILY MEDICINE

## 2023-08-28 PROCEDURE — 36415 COLL VENOUS BLD VENIPUNCTURE: CPT | Mod: PO | Performed by: FAMILY MEDICINE

## 2023-08-28 PROCEDURE — 85025 COMPLETE CBC W/AUTO DIFF WBC: CPT | Performed by: FAMILY MEDICINE

## 2023-08-28 PROCEDURE — 86803 HEPATITIS C AB TEST: CPT | Performed by: FAMILY MEDICINE

## 2023-08-28 PROCEDURE — 80061 LIPID PANEL: CPT | Performed by: FAMILY MEDICINE

## 2023-09-25 ENCOUNTER — OFFICE VISIT (OUTPATIENT)
Dept: FAMILY MEDICINE | Facility: CLINIC | Age: 28
End: 2023-09-25
Payer: COMMERCIAL

## 2023-09-25 DIAGNOSIS — M25.362 INSTABILITY OF KNEE JOINT, LEFT: ICD-10-CM

## 2023-09-25 DIAGNOSIS — F32.A ANXIETY AND DEPRESSION: Primary | ICD-10-CM

## 2023-09-25 DIAGNOSIS — F41.9 ANXIETY AND DEPRESSION: Primary | ICD-10-CM

## 2023-09-25 PROCEDURE — 99214 OFFICE O/P EST MOD 30 MIN: CPT | Mod: 95,,, | Performed by: FAMILY MEDICINE

## 2023-09-25 PROCEDURE — 1160F PR REVIEW ALL MEDS BY PRESCRIBER/CLIN PHARMACIST DOCUMENTED: ICD-10-PCS | Mod: CPTII,95,, | Performed by: FAMILY MEDICINE

## 2023-09-25 PROCEDURE — 3044F HG A1C LEVEL LT 7.0%: CPT | Mod: CPTII,95,, | Performed by: FAMILY MEDICINE

## 2023-09-25 PROCEDURE — 3044F PR MOST RECENT HEMOGLOBIN A1C LEVEL <7.0%: ICD-10-PCS | Mod: CPTII,95,, | Performed by: FAMILY MEDICINE

## 2023-09-25 PROCEDURE — 99214 PR OFFICE/OUTPT VISIT, EST, LEVL IV, 30-39 MIN: ICD-10-PCS | Mod: 95,,, | Performed by: FAMILY MEDICINE

## 2023-09-25 PROCEDURE — 1159F PR MEDICATION LIST DOCUMENTED IN MEDICAL RECORD: ICD-10-PCS | Mod: CPTII,95,, | Performed by: FAMILY MEDICINE

## 2023-09-25 PROCEDURE — 1159F MED LIST DOCD IN RCRD: CPT | Mod: CPTII,95,, | Performed by: FAMILY MEDICINE

## 2023-09-25 PROCEDURE — 1160F RVW MEDS BY RX/DR IN RCRD: CPT | Mod: CPTII,95,, | Performed by: FAMILY MEDICINE

## 2023-09-25 NOTE — PROGRESS NOTES
Assessment & Plan:    Anxiety and depression  Improving. Continue Wellbutrin  mg. May titrate dose up if needed in the future.    Instability of knee joint, left  -     Ambulatory referral/consult to Orthopedics; Future; Expected date: 10/02/2023  -     Ambulatory referral/consult to Physical/Occupational Therapy; Future; Expected date: 10/02/2023    Recommended to proceed with PT before evaluation with an MRI for soft tissue injury. He is also interested in seeing Orthopedics for further evaluation.     The patient location is:  Patient Home   The chief complaint leading to consultation is: noted below  Visit type: Virtual visit with synchronous audio and video  Total time spent with patient: 10 minutes  Each patient to whom he or she provides medical services by telemedicine is:  (1) informed of the relationship between the physician and patient and the respective role of any other health care provider with respect to management of the patient; and (2) notified that he or she may decline to receive medical services by telemedicine and may withdraw from such care at any time.    Follow-up: Follow up if symptoms worsen or fail to improve.    ______________________________________________________________________    Chief Complaint  Chief Complaint   Patient presents with    Anxiety    Depression       HPI  William Herrera is a 28 y.o. male with multiple medical diagnoses as listed in the medical history and problem list that presents in a virtual visit to follow up on anxiety and depression.  Pt is known to me with his last appointment 8/25/2023 during which his Wellbutrin XL was increased to 300 mg. He states that he has been feeling pretty good overall. He feels more productive and has better motivation to complete tasks. He has been feeling more anxious lately but this could be caused by outside stressors. He would like to continue the Wellbutrin for another month to see if his anxiety improves.    As an aside,  he continues to experience left knee instability and would like to evaluate for a soft tissue injury.       PAST MEDICAL HISTORY:  History reviewed. No pertinent past medical history.    PAST SURGICAL HISTORY:  History reviewed. No pertinent surgical history.    SOCIAL HISTORY:  Social History     Socioeconomic History    Marital status: Single   Occupational History     Comment: hotel    Tobacco Use    Smoking status: Light Smoker    Smokeless tobacco: Never   Substance and Sexual Activity    Alcohol use: Yes     Comment: 2 bottles of wine monthly    Drug use: No    Sexual activity: Never   Social History Narrative    Patient was born and raised in [place] [place]. He was raised by his biological parents. He has one older sister. He denies any early trauma. He is currently enrolled at the [employer] studying hospitality. He is currently living at home with his mother but plans on moving out to a new apartment with a roommate in a week. He currently works at the [employer]. He has been working there since 2017. He is single and does not have any children. He denies any previous legal history. He will drinking 2 bottles of wine monthly roughly.         He denies any illicit drug use.       FAMILY HISTORY:  Family History   Problem Relation Age of Onset    Thyroid disease Sister     Diabetes Maternal Aunt     Diabetes Maternal Grandmother     Heart disease Maternal Grandmother     Heart disease Maternal Grandfather     Depression Mother     Anxiety disorder Mother        ALLERGIES AND MEDICATIONS: updated and reviewed.  Review of patient's allergies indicates:  No Known Allergies  Current Outpatient Medications   Medication Sig Dispense Refill    buPROPion (WELLBUTRIN XL) 300 MG 24 hr tablet Take 1 tablet (300 mg total) by mouth once daily. 30 tablet 11    escitalopram oxalate (LEXAPRO) 10 MG tablet Take 1 tablet (10 mg total) by mouth once daily. (Patient not taking: Reported on 8/25/2023)  30 tablet 11    metroNIDAZOLE (FLAGYL) 500 MG tablet Take 500 mg by mouth 3 (three) times daily.      naproxen (NAPROSYN) 500 MG tablet Take 1 tablet (500 mg total) by mouth 2 (two) times daily as needed (pain). (Patient not taking: Reported on 8/25/2023) 60 tablet 0     No current facility-administered medications for this visit.         ROS  Review of Systems   Constitutional:  Negative for activity change and unexpected weight change.   HENT:  Negative for hearing loss, rhinorrhea and trouble swallowing.    Eyes:  Negative for discharge and visual disturbance.   Respiratory:  Negative for chest tightness and wheezing.    Cardiovascular:  Negative for chest pain and palpitations.   Gastrointestinal:  Negative for blood in stool, constipation, diarrhea and vomiting.   Endocrine: Negative for polydipsia and polyuria.   Genitourinary:  Negative for difficulty urinating, hematuria and urgency.   Musculoskeletal:  Negative for arthralgias, joint swelling and neck pain.   Neurological:  Negative for weakness and headaches.   Psychiatric/Behavioral:  Negative for confusion and dysphoric mood. The patient is nervous/anxious.            Physical Exam  Physical Exam  Constitutional:       General: He is not in acute distress.  Neurological:      Mental Status: He is alert.   Psychiatric:         Mood and Affect: Mood normal.         *Physical exam limited by virtual visit.    Health Maintenance         Date Due Completion Date    Pneumococcal Vaccines (Age 0-64) (1 - PCV) 02/27/2003 1997    COVID-19 Vaccine (3 - Pfizer series) 07/06/2021 5/11/2021    Influenza Vaccine (1) 09/01/2023 9/20/2011    TETANUS VACCINE 10/15/2028 10/15/2018

## 2023-10-02 ENCOUNTER — OFFICE VISIT (OUTPATIENT)
Dept: ORTHOPEDICS | Facility: CLINIC | Age: 28
End: 2023-10-02
Payer: COMMERCIAL

## 2023-10-02 DIAGNOSIS — M25.362 INSTABILITY OF KNEE JOINT, LEFT: ICD-10-CM

## 2023-10-02 DIAGNOSIS — R29.898 POPPING OF LEFT KNEE JOINT: Primary | ICD-10-CM

## 2023-10-02 PROCEDURE — 1159F PR MEDICATION LIST DOCUMENTED IN MEDICAL RECORD: ICD-10-PCS | Mod: CPTII,S$GLB,, | Performed by: ORTHOPAEDIC SURGERY

## 2023-10-02 PROCEDURE — 1160F PR REVIEW ALL MEDS BY PRESCRIBER/CLIN PHARMACIST DOCUMENTED: ICD-10-PCS | Mod: CPTII,S$GLB,, | Performed by: ORTHOPAEDIC SURGERY

## 2023-10-02 PROCEDURE — 99999 PR PBB SHADOW E&M-EST. PATIENT-LVL III: ICD-10-PCS | Mod: PBBFAC,,, | Performed by: ORTHOPAEDIC SURGERY

## 2023-10-02 PROCEDURE — 99203 PR OFFICE/OUTPT VISIT, NEW, LEVL III, 30-44 MIN: ICD-10-PCS | Mod: S$GLB,,, | Performed by: ORTHOPAEDIC SURGERY

## 2023-10-02 PROCEDURE — 99203 OFFICE O/P NEW LOW 30 MIN: CPT | Mod: S$GLB,,, | Performed by: ORTHOPAEDIC SURGERY

## 2023-10-02 PROCEDURE — 1160F RVW MEDS BY RX/DR IN RCRD: CPT | Mod: CPTII,S$GLB,, | Performed by: ORTHOPAEDIC SURGERY

## 2023-10-02 PROCEDURE — 1159F MED LIST DOCD IN RCRD: CPT | Mod: CPTII,S$GLB,, | Performed by: ORTHOPAEDIC SURGERY

## 2023-10-02 PROCEDURE — 3044F PR MOST RECENT HEMOGLOBIN A1C LEVEL <7.0%: ICD-10-PCS | Mod: CPTII,S$GLB,, | Performed by: ORTHOPAEDIC SURGERY

## 2023-10-02 PROCEDURE — 99999 PR PBB SHADOW E&M-EST. PATIENT-LVL III: CPT | Mod: PBBFAC,,, | Performed by: ORTHOPAEDIC SURGERY

## 2023-10-02 PROCEDURE — 3044F HG A1C LEVEL LT 7.0%: CPT | Mod: CPTII,S$GLB,, | Performed by: ORTHOPAEDIC SURGERY

## 2023-10-02 NOTE — PROGRESS NOTES
"Assessment: 28 y.o. male with left knee popping     I explained my diagnostic impression and the reasoning behind it in detail, using layman's terms.      Plan:   - Agree with PT  - will get full set of knee XR today before he goes  - If it becomes painful can consider NSAIDs, no indication today as he is not having pain   - RTC 12 weeks, return sooner for worsening symptoms      All questions were answered in detail. The patient is in full agreement with the treatment plan and will proceed accordingly.    Chief Complaint   Patient presents with    Left Knee - Pain       Initial visit (10/2/23): William Herrera is a 28 y.o. male who presents today complaining of Pain of the Left Knee     Fall 2 years ago  Did not have insurance at the time and did not get it addressed  Was able to return to work after the injury, knee felt weird later that day and he rested for a few days  Did not swell much   Feels like it is "out of place"   No pain  Puts most of his weight on the RLE, avoids using the LLE a lot because he is worried about making it worse and he is overcompensating for it   Does have a PT order   Aggravating factors: squatting, sitting with the leg dangling or unsupported, sitting for long periods of time   Relieving factors: rest  Able to workout   Associated symptoms:  popping with flexion, feels this over lateral inferior patella  Pain does interfere with activities of daily living .      This patient was seen in consultation at the request of Dr. Lila Strickland    This is the extent of the patient's complaints at this time.     Occupation: Works in a kitchen and as an         Review of patient's allergies indicates:  No Known Allergies      Current Outpatient Medications:     buPROPion (WELLBUTRIN XL) 300 MG 24 hr tablet, Take 1 tablet (300 mg total) by mouth once daily., Disp: 30 tablet, Rfl: 11    metroNIDAZOLE (FLAGYL) 500 MG tablet, Take 500 mg by mouth 3 (three) times daily., Disp: , Rfl:     " naproxen (NAPROSYN) 500 MG tablet, Take 1 tablet (500 mg total) by mouth 2 (two) times daily as needed (pain)., Disp: 60 tablet, Rfl: 0    escitalopram oxalate (LEXAPRO) 10 MG tablet, Take 1 tablet (10 mg total) by mouth once daily. (Patient not taking: Reported on 8/25/2023), Disp: 30 tablet, Rfl: 11    Physical Exam:   Vitals:    10/02/23 0951   PainSc: 0-No pain   PainLoc: Knee       General:   Patient is alert, awake and oriented to time, place and person. Mood and affect are appropriate.  Patient does not appear to be in any distress, denies any constitutional symptoms and appears stated age.   HEENT:  Pupils are equal and round, sclera are not injected. External examination of ears and nose reveals no abnormalities. Cranial nerves II-X are grossly intact  Skin:  no rashes, abrasions or open wounds on the affected extremity   Resp:  No respiratory distress or audible wheezing   CV: 2+  pulses, all extremities warm and well perfused   Left KNEE EXAMINATION      OBSERVATION / INSPECTION   Gait:                            non antalgic  Alignment:                  neutral  Scars:                         none  Muscle atrophy:          mild  Effusion:                     none  Warmth:                      none   Discoloration:              none      TENDERNESS                                                                                                                               Patella                         -                       Lateral joint line                       -   Peripatellar medial      -                       Medial joint line                       -       Peripatellar lateral       -                       Medial plica                             -             Patellar tendon            -                       Popliteal fossa                        -             Quad tendon               -                       Gastrocnemius                        -  Prepatellar Bursa        -                        Quadricep                               -  Tibial tubercle              -                      Thigh/hamstring                      -  Pes anserine/HS         -                       Fibula                                      -  ITB                               -                      Tibia                                        -  Tib/fib joint                   -                      LCL                                          -                         MFC                            -                       MCL:    Proximal                      -                         LFC                             -                                   Distal                           -                                                                ROM:                          PASSIVE                                                 ACTIVE   Left :                            0 / 130                                0 / 140    Right :                          0 / 140                                0 / 140      PATELLOFEMORAL EXAMINATION:  See above noted areas of tenderness.   Patella position    Subluxation / dislocation:         Normal   Sup / Inf;                                  Normal   Crepitus (PF):                                      Absent   Patellar Mobility:                                               Medial-lateral:                         Normal  Superior-inferior:                     Normal   Inferior tilt                                Normal  Patellar tendon:                       Normal  Lateral tilt:                               Normal  J-sign:                                      absent   Patellofemoral grind:               non painful         MENISCAL SIGNS:                              Pain on terminal extension:                no  Pain on terminal flexion:                     no but does guard  Armidas maneuver:                       mild discomfort with medial     LIGAMENT EXAMINATION:  ACL /  Lachman:         normal (0 - 2 mm)   PCL-Post.  drawer:   normal (0 - 2 mm)  MCL- Valgus:             normal (0 - 2 mm)  LCL- Varus:  normal (0 - 2 mm)       STRENGTH:   (* = with pain)           PAINFUL SIDE  Quadricep                               5/5  Hamstrin/5     EXTREMITY NEURO-VASCULAR EXAMINATION:     Sensation:  Grossly intact to light touch all dermatomal regions.   Motor Function:  Fully intact motor function at hip, knee, foot and ankle    DTRs;  quadriceps and  achilles 2+.    Vascular status:  DP and PT pulses 2+, brisk capillary refill, symmetric.      Imagin views of the left knee negative for degenerative changes    I personally reviewed and interpreted the patient's imaging obtained today in clinic     A note notifying Dr. Lila Strickland of my findings was sent via the electronic medical record     This note was created by combination of typed  and M-Modal dictation. Transcription and phonetic errors may be present.  If there are any questions, please contact me.    No past medical history on file.    There are no problems to display for this patient.      No past surgical history on file.    Family History   Problem Relation Age of Onset    Thyroid disease Sister     Diabetes Maternal Aunt     Diabetes Maternal Grandmother     Heart disease Maternal Grandmother     Heart disease Maternal Grandfather     Depression Mother     Anxiety disorder Mother

## 2024-10-20 DIAGNOSIS — Z00.00 ANNUAL PHYSICAL EXAM: Primary | ICD-10-CM

## 2024-10-20 DIAGNOSIS — F41.9 ANXIETY AND DEPRESSION: ICD-10-CM

## 2024-10-20 DIAGNOSIS — F32.A ANXIETY AND DEPRESSION: ICD-10-CM

## 2024-10-20 NOTE — TELEPHONE ENCOUNTER
Care Due:                  Date            Visit Type   Department     Provider  --------------------------------------------------------------------------------                                ESTABLISHED   [facility]                              PATIENT -    MED/ INTERNAL  Last Visit: 09-      VIRTUAL      MED/ PEDS      Lila Strickland  Next Visit: None Scheduled  None         None Found                                                            Last  Test          Frequency    Reason                     Performed    Due Date  --------------------------------------------------------------------------------    Office Visit  15 months..  buPROPion................  09- 12-    [facility] Embedded Care Due Messages. Reference number: [id number].   10/20/2024 5:51:38 PM CDT

## 2024-10-21 RX ORDER — BUPROPION HYDROCHLORIDE 300 MG/1
300 TABLET ORAL
Qty: 90 TABLET | Refills: 0 | Status: SHIPPED | OUTPATIENT
Start: 2024-10-21

## 2024-10-21 NOTE — TELEPHONE ENCOUNTER
Agreed to refill patient's Wellbutrin for 90 days. He will be due for labs and an in-person follow-up appointment before their next refill is due. Please schedule.

## 2024-10-21 NOTE — TELEPHONE ENCOUNTER
Refill Routing Note   Medication(s) are not appropriate for processing by [facility] for the following reason(s):        Required vitals outdated    [facility] action(s):  Defer   Requires appointment : Yes               Appointments  past 12m or future 3m with PCP    Date Provider   Last Visit   9/25/2023 Lila Strickland, DO   Next Visit   Visit date not found Lila Strickland, DO   ED visits in past 90 days: 0        Note composed:2:31 AM 10/21/2024

## 2025-02-12 DIAGNOSIS — F41.9 ANXIETY AND DEPRESSION: ICD-10-CM

## 2025-02-12 DIAGNOSIS — F32.A ANXIETY AND DEPRESSION: ICD-10-CM

## 2025-02-13 NOTE — TELEPHONE ENCOUNTER
Care Due:                  Date            Visit Type   Department     Provider  --------------------------------------------------------------------------------                                ESTABLISHED   [facility]                              PATIENT -    MED/ INTERNAL  Last Visit: 09-      VIRTUAL      MED/ PEDS      Lila Strickland  Next Visit: None Scheduled  None         None Found                                                            Last  Test          Frequency    Reason                     Performed    Due Date  --------------------------------------------------------------------------------    Office Visit  15 months..  buPROPion................  09- 12-    [facility] Embedded Care Due Messages. Reference number: [id number].   2/12/2025 11:06:45 PM CST

## 2025-02-17 RX ORDER — BUPROPION HYDROCHLORIDE 300 MG/1
300 TABLET ORAL
Qty: 90 TABLET | Refills: 0 | OUTPATIENT
Start: 2025-02-17

## 2025-02-17 NOTE — TELEPHONE ENCOUNTER
Provider Staff:  Please note Refusal of medication.     Action required for this patient.      Requested Prescriptions     Refused Prescriptions Disp Refills    buPROPion (WELLBUTRIN XL) 300 MG 24 hr tablet [Pharmacy Med Name: BUPROPION XL 300MG TABLETS] 90 tablet 0     Sig: TAKE 1 TABLET BY MOUTH EVERY DAY     Refused By: KERRIE GREGORY     Reason for Refusal: Patient needs an appointment      Thanks!  [facility]   Note composed: 02/17/2025 12:47 PM

## 2025-02-17 NOTE — TELEPHONE ENCOUNTER
Refill Routing Note   Medication(s) are not appropriate for processing by [facility] for the following reason(s):        Patient not seen by provider within 15 months  Required vitals outdated    [facility] action(s):  Defer   Requires appointment : Yes               Appointments  past 12m or future 3m with PCP    Date Provider   Last Visit   9/25/2023 Lila Strickland, DO   Next Visit   Visit date not found Lila Strickland, DO   ED visits in past 90 days: 0        Note composed:11:30 AM 02/17/2025

## 2025-04-23 DIAGNOSIS — F41.9 ANXIETY AND DEPRESSION: ICD-10-CM

## 2025-04-23 DIAGNOSIS — F32.A ANXIETY AND DEPRESSION: ICD-10-CM

## 2025-04-23 RX ORDER — BUPROPION HYDROCHLORIDE 300 MG/1
300 TABLET ORAL DAILY
Qty: 90 TABLET | Refills: 0 | Status: SHIPPED | OUTPATIENT
Start: 2025-04-23

## 2025-04-23 NOTE — TELEPHONE ENCOUNTER
----- Message from Charlotte sent at 4/22/2025  4:02 PM CDT -----  Call the clinic reply in [facility]:Y Please refill the medication(s) listed below. Please call the patient when the prescription(s) is ready for  at this phone number.[phone] #1:buPROPion (WELLBUTRIN XL) 300 MG 24 hr tabletMedication #2:Preferred Pharmacy:  [id number] - [place], [place] - [address] AT [facility] & [id number] [id number]: [phone] Fax: [phone]

## 2025-04-23 NOTE — TELEPHONE ENCOUNTER
Care Due:                  Date            Visit Type   Department     Provider  --------------------------------------------------------------------------------                                ESTABLISHED   [facility]                              PATIENT -    / INTERNAL MED  Last Visit: 09-      KEVIN      / GIOVANNY Strickland  Next Visit: None Scheduled  None         None Found                                                            Last  Test          Frequency    Reason                     Performed    Due Date  --------------------------------------------------------------------------------    Office Visit  15 months..  buPROPion................  09- 12-    [facility] Embedded Care Due Messages. Reference number: [id number].   4/23/2025 11:21:40 AM CDT

## 2025-07-28 DIAGNOSIS — F32.A ANXIETY AND DEPRESSION: ICD-10-CM

## 2025-07-28 DIAGNOSIS — F41.9 ANXIETY AND DEPRESSION: ICD-10-CM

## 2025-07-28 RX ORDER — BUPROPION HYDROCHLORIDE 300 MG/1
300 TABLET ORAL DAILY
Qty: 90 TABLET | Refills: 0 | OUTPATIENT
Start: 2025-07-28

## 2025-07-28 NOTE — TELEPHONE ENCOUNTER
No care due was identified.  [facility] Embedded Care Due Messages. Reference number: [id number].   7/28/2025 2:11:04 PM CDT